# Patient Record
(demographics unavailable — no encounter records)

---

## 2025-05-29 NOTE — DATA REVIEWED
[FreeTextEntry1] : 10/17/19 ([facility]) b/l mmg & US: heterogenously dense breasts. Mildly enlarged axillary LNs, given history of breast caner, US biopsy is recommended of the left axillary LN. BI-RADS 4  10/16/20 ([facility]) b/l mmg: scattered fibroglandular density. Persistent prominent elft axillary LN. Repeat assessment w/ let axillary US is recommended. A biopsy of the LN was recommended on 10/17/19, and there is no record of the biopsy being performed. BI-RADS 0.   10/22/2020 ([facility]) b/l breast US: persistent suspicious left axillary LAD, further evaluation w/ US guided biopsy is recommended. BI-RADS 4.   12/2/2020 ([facility]) Left axillary lymph node, 2.6cm, FNA: POSITIVE FOR MALIGNANT CELLS. Cellular specimen consisting of monomorphic population of small lymphocytes consistent with chronic lymphocytic leukemia/small lymphocytic lymphoma (CLL/SLL).  12/2/2020 ([facility]) left axillary noted core biopsy: Low-grade B-cell lymphoma consistent with chronic lymphocytic leukemia/small lymphocytic lymphoma (CLL/SLL) - See Note.  10/4/21 ([facility]) b/l dx mmg & US: Left axillary lymphadenopathy is suspicious for malignancy. Ultrasound-guided core needle biopsy is recommended. The patient was informed of the recommendation and was given a form with instructions to schedule the biopsy. BI-RADS 4.  10/11/21 ([facility]) left US bx: The result is chronic lymphocytic leukemia/small lymphocytic lymphoma (CLL/SLL). This is malignant and is concordant with imaging findings. Consultation with hematologic oncology is recommended.  12/8/22 ([facility]) b/l sMmg and US: scattered fibroglandular density. Stable postsurgical and posttreatment changes in the right breast s/p right lumpectomy. Massive left axillary lymph node lymphadenopathy consistent with known CLL. BI-RADS 2.   12/4/2023 ([facility]) bilateral screening mammogram and ultrasound: Scattered fibroglandular density. No evidence of malignancy. Status post right breast conservation therapy. Stable enlarged left axillary lymph node consistent with history of CLL/SLL. BI-RADS 2  1/10/25 ([facility]) b/l screening mammogram: scattered fbg density. No evidence of malignancy. BI-RADS 2.

## 2025-05-29 NOTE — REASON FOR VISIT
[Follow-Up: _____] : a [unfilled] follow-up visit [FreeTextEntry1] : history of right breast IDC s/p lumpectomy in 2017.

## 2025-05-29 NOTE — ASSESSMENT
[FreeTextEntry1] : 86 yo female with a history of right breast cancer s/p lumpectomy SLN XRT, on Letrozole. She has CLL that is being managed by med onc; not actively getting treatment.  She is overall doing well.   Discussed the importance of breast self awareness. Encouraged the patient to become familiar with her tissue so as to be aware of any changes from her baseline.  She is due for a mammogram in January for surveillance; if that is benign, will plan on RTC after for another breast exam. The patient verbalized understanding and is in agreement with the plan.

## 2025-05-29 NOTE — HISTORY OF PRESENT ILLNESS
[FreeTextEntry1] : Alberta is a 84 yo female, presents for survivorship. She has a history of right breast, invasive ductal carcinoma, ER+ (>95% 3+) AK + (80% 2+/3+) HER2 negative. She is s/p right lumpectomy in 2017 SLNB positive for chronic lymphoblastic leukemia (on both core biopsy and final surgical path), s/p XRT. She is currently on letrozole managed by med onc at [address] [place] [address] (previous med onc left, she doesn't remember the name of the one she saw recently); Patient has no breast complaints today; is overall doing well, appetite good, sees her PCP regularly; scheduled for a colonoscopy in June. Up to date on vaccines. Denies nipple discharge, nipple/breast skin changes or dimpling, or breast pain.   The patient has CLL that is being managed by Dr. Myers most recent left axillary LN core bx on 10/11/21 revealed CL/SLL c/w known disease. She is not currently getting treatment, but is being monitored closely.

## 2025-05-29 NOTE — PHYSICAL EXAM
[Normocephalic] : normocephalic [Atraumatic] : atraumatic [No Supraclavicular Adenopathy] : no supraclavicular adenopathy [Examined in the supine and seated position] : examined in the supine and seated position [No dominant masses] : no dominant masses in right breast  [No dominant masses] : no dominant masses left breast [No Nipple Retraction] : no left nipple retraction [No Nipple Discharge] : no left nipple discharge [No Edema] : no edema [No Rashes] : no rashes [No Ulceration] : no ulceration [No Cervical Adenopathy] : no cervical adenopathy [Breast Nipple Inversion] : nipples not inverted [Breast Nipple Flattening] : nipples not flattened [Breast Nipple Retraction] : nipples not retracted [Breast Nipple Fissures] : nipples not fissured [No Axillary Lymphadenopathy] : no left axillary lymphadenopathy [de-identified] : well healed right lateral breast and  R axillary incision, no masses palpable, no nipple inversion, no nipple discharge, no skin tethering